# Patient Record
Sex: MALE | Race: WHITE | NOT HISPANIC OR LATINO
[De-identification: names, ages, dates, MRNs, and addresses within clinical notes are randomized per-mention and may not be internally consistent; named-entity substitution may affect disease eponyms.]

---

## 2017-02-13 ENCOUNTER — TRANSCRIPTION ENCOUNTER (OUTPATIENT)
Age: 27
End: 2017-02-13

## 2017-11-01 ENCOUNTER — RX RENEWAL (OUTPATIENT)
Age: 27
End: 2017-11-01

## 2017-11-03 ENCOUNTER — RX RENEWAL (OUTPATIENT)
Age: 27
End: 2017-11-03

## 2017-12-05 ENCOUNTER — RX RENEWAL (OUTPATIENT)
Age: 27
End: 2017-12-05

## 2019-04-30 ENCOUNTER — TRANSCRIPTION ENCOUNTER (OUTPATIENT)
Age: 29
End: 2019-04-30

## 2019-09-03 ENCOUNTER — TRANSCRIPTION ENCOUNTER (OUTPATIENT)
Age: 29
End: 2019-09-03

## 2019-09-17 ENCOUNTER — TRANSCRIPTION ENCOUNTER (OUTPATIENT)
Age: 29
End: 2019-09-17

## 2019-10-09 ENCOUNTER — TRANSCRIPTION ENCOUNTER (OUTPATIENT)
Age: 29
End: 2019-10-09

## 2021-09-21 ENCOUNTER — TRANSCRIPTION ENCOUNTER (OUTPATIENT)
Age: 31
End: 2021-09-21

## 2021-10-28 ENCOUNTER — EMERGENCY (EMERGENCY)
Facility: HOSPITAL | Age: 31
LOS: 0 days | Discharge: HOME | End: 2021-10-28
Attending: EMERGENCY MEDICINE
Payer: COMMERCIAL

## 2021-10-28 VITALS
HEART RATE: 88 BPM | RESPIRATION RATE: 20 BRPM | OXYGEN SATURATION: 98 % | DIASTOLIC BLOOD PRESSURE: 89 MMHG | SYSTOLIC BLOOD PRESSURE: 145 MMHG | TEMPERATURE: 98 F

## 2021-10-28 VITALS — HEIGHT: 70 IN

## 2021-10-28 DIAGNOSIS — H57.13 OCULAR PAIN, BILATERAL: ICD-10-CM

## 2021-10-28 DIAGNOSIS — F41.9 ANXIETY DISORDER, UNSPECIFIED: ICD-10-CM

## 2021-10-28 DIAGNOSIS — F32.9 MAJOR DEPRESSIVE DISORDER, SINGLE EPISODE, UNSPECIFIED: ICD-10-CM

## 2021-10-28 DIAGNOSIS — H16.293 OTHER KERATOCONJUNCTIVITIS, BILATERAL: ICD-10-CM

## 2021-10-28 DIAGNOSIS — H53.149 VISUAL DISCOMFORT, UNSPECIFIED: ICD-10-CM

## 2021-10-28 PROCEDURE — 99283 EMERGENCY DEPT VISIT LOW MDM: CPT

## 2021-10-28 RX ORDER — TRAMADOL HYDROCHLORIDE 50 MG/1
1 TABLET ORAL
Qty: 6 | Refills: 0
Start: 2021-10-28 | End: 2021-10-29

## 2021-10-28 RX ORDER — TRAMADOL HYDROCHLORIDE 50 MG/1
1 TABLET ORAL
Qty: 9 | Refills: 0
Start: 2021-10-28 | End: 2021-10-30

## 2021-10-28 RX ORDER — FLUORESCEIN SODIUM 9 MG
1 STRIP OPHTHALMIC (EYE) ONCE
Refills: 0 | Status: COMPLETED | OUTPATIENT
Start: 2021-10-28 | End: 2021-10-28

## 2021-10-28 RX ORDER — ERYTHROMYCIN BASE 5 MG/GRAM
1 OINTMENT (GRAM) OPHTHALMIC (EYE)
Qty: 1 | Refills: 0
Start: 2021-10-28 | End: 2021-11-03

## 2021-10-28 RX ADMIN — Medication 1 APPLICATION(S): at 23:05

## 2021-10-28 RX ADMIN — Medication 2 DROP(S): at 23:06

## 2021-10-28 NOTE — ED STATDOCS - CARE PROVIDER_API CALL
Charles Rolle  OPHTHALMOLOGY  [address] [place] [address], [address]  [place] [address]  Phone: [phone]  Fax: [phone]  Follow Up Time: Urgent

## 2021-10-28 NOTE — ED STATDOCS - CLINICAL SUMMARY MEDICAL DECISION MAKING FREE TEXT BOX
With fluorescein stain eyes, eval for other cause, UV keratitis vs. conjunctivitis, vs. foreign body vs. corneal abrasion.

## 2021-10-28 NOTE — ED ADULT TRIAGE NOTE - CHIEF COMPLAINT QUOTE
Pt. presents to ED c/o bilateral eye pain/burning. Pt. states he was driving home from work when his eyes began to burn. Pt. put visine in eyes and then eyes worsened. Pt. reports burning and states it is too painful to open his eyes

## 2021-10-28 NOTE — ED ADULT NURSE NOTE - OBJECTIVE STATEMENT
Pt. reports to ED for eye pain.  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  Pt. reports he rested his eyes and was unable to open them without feeling like "sandpaper" scratching his eyes. Pt. reports to ED for eye pain.  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  Pt. reports he rested his eyes and was unable to open them without feeling like "sandpaper" scratching his eyes.  Pt presents with eye redness.

## 2021-10-28 NOTE — ED STATDOCS - NSFOLLOWUPINSTRUCTIONS_ED_ALL_ED_FT
Dry Eye Syndrome    WHAT YOU NEED TO KNOW:    Dry eye syndrome happens when the eye has trouble keeping moisture. This may be caused by a lack of tears or having tears that cannot moisturize the eye. It may also happen when tears leave the eye too quickly. Dry eye syndrome may also be called dry eye disease or keratoconjunctivitis sicca (KCS).     DISCHARGE INSTRUCTIONS:    Contact your healthcare provider if:   •Your dry eyes do not get better with treatment or get worse.       •You have thick, yellow drainage from one or both eyes.       •Your eyelids or skin around your eyes is red and swollen.       •You have changes in your vision.       •You have questions or concerns about your condition or care.      Medicines:   •Medicine may be given to decrease pain or swelling, or treat an eye infection. You may also need medicine to help your eyes make more tears. These medicines will be given as eyedrops.       •Take your medicine as directed. Contact your healthcare provider if you think your medicine is not helping or if you have side effects. Tell him of her if you are allergic to any medicine. Keep a list of the medicines, vitamins, and herbs you take. Include the amounts, and when and why you take them. Bring the list or the pill bottles to follow-up visits. Carry your medicine list with you in case of an emergency.      Self-care:   •Use artificial tears, gels, and lubricating ointments as directed. They are available without a doctor's order. These products can replace tears and help add moisture to your eyes. Ask your healthcare provider how often to use these products. Also ask where to buy them.       •Apply a warm compress to your eyelids as directed. Use a soft washcloth soaked in warm water. Leave the compress on your eyelids for 5 minutes. Gently massage your eyelids after you remove the compress. These actions may help open your tear glands. Your tear glands can make oil that will help keep tears and moisture on the eye's surface.       •Wear glasses or sunglasses that cover the sides of your eyes and fit close to your face. These will protect your eyes from dry air. They may also help keep moisture in your eyes.       •Use a humidifier in your home. A humidifier may help keep moisture in the air and prevent dry eyes.       •Take vitamins and supplements as directed. Certain vitamins and supplements may help decrease eye dryness. Examples include fish oil and vitamin A. Ask your healthcare provider what supplements you need and how often to take them.       •Eat foods with high amounts of omega-3 fatty acids. Examples include salmon, tuna, walnuts, and flaxseeds. Omega-3 fatty acids may help relieve dry eyes. Ask your healthcare provider for a list of foods that contain fatty acids and how much you should eat each day.   Sources of Omega 3           •Do not smoke. Nicotine and other chemicals in cigarettes and cigars can cause lung damage. Smoke from cigarettes and cigars can make dry eyes worse. Ask your healthcare provider for information if you currently smoke and need help to quit. E-cigarettes or smokeless tobacco still contain nicotine. Talk to your healthcare provider before you use these products.       Follow up with your healthcare provider as directed: Write down your questions so you remember to ask them during your visits.

## 2021-10-28 NOTE — ED STATDOCS - PATIENT PORTAL LINK FT
You can access the FollowMyHealth Patient Portal offered by [facility] by registering at the following website: [email or web address]. By joining [facility]’s FollowMyHealth portal, you will also be able to view your health information using other applications (apps) compatible with our system.

## 2021-10-28 NOTE — ED STATDOCS - PROGRESS NOTE DETAILS
FLuorescein staining without any uptake to suggest abrasion.  Consider UV keratitis vs keratoconjunctivitis.  Significant improvement with tetracaine.  NOt a contact lens wearer.  Will start on erythromycin ointment and encouraged saline drops.  Is able to get close f/u with Dr. Rolle.  D/c home with strict return precautions and prompt outpatient f/u.

## 2021-10-28 NOTE — ED STATDOCS - OBJECTIVE STATEMENT
30 y/o male presents to the ED c/o b/l eye pain and photophobia. Pt reports he feels like "sand paper is in the eye." Pt denies contact lens use. Pt does not know of any eye exposure. 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

## 2022-03-19 ENCOUNTER — TRANSCRIPTION ENCOUNTER (OUTPATIENT)
Age: 32
End: 2022-03-19

## 2022-07-19 ENCOUNTER — NON-APPOINTMENT (OUTPATIENT)
Age: 32
End: 2022-07-19

## 2022-07-26 ENCOUNTER — APPOINTMENT (OUTPATIENT)
Dept: INTERNAL MEDICINE | Facility: CLINIC | Age: 32
End: 2022-07-26

## 2022-07-26 VITALS
HEIGHT: 70 IN | SYSTOLIC BLOOD PRESSURE: 130 MMHG | BODY MASS INDEX: 31.78 KG/M2 | WEIGHT: 222 LBS | DIASTOLIC BLOOD PRESSURE: 84 MMHG

## 2022-07-26 DIAGNOSIS — Z00.00 ENCOUNTER FOR GENERAL ADULT MEDICAL EXAMINATION W/OUT ABNORMAL FINDINGS: ICD-10-CM

## 2022-07-26 DIAGNOSIS — G43.909 MIGRAINE, UNSPECIFIED, NOT INTRACTABLE, W/OUT STATUS MIGRAINOSUS: ICD-10-CM

## 2022-07-26 PROCEDURE — 36415 COLL VENOUS BLD VENIPUNCTURE: CPT

## 2022-07-26 PROCEDURE — 99385 PREV VISIT NEW AGE 18-39: CPT | Mod: 25

## 2022-07-26 RX ORDER — SERTRALINE HYDROCHLORIDE 100 MG/1
100 TABLET, FILM COATED ORAL DAILY
Qty: 90 | Refills: 1 | Status: ACTIVE | COMMUNITY
Start: 2021-10-12

## 2022-07-26 RX ORDER — SUMATRIPTAN 50 MG/1
50 TABLET, FILM COATED ORAL
Qty: 9 | Refills: 2 | Status: ACTIVE | COMMUNITY
Start: 2022-07-26

## 2022-07-26 RX ORDER — LISDEXAMFETAMINE DIMESYLATE 50 MG/1
50 CAPSULE ORAL DAILY
Qty: 30 | Refills: 0 | Status: DISCONTINUED | COMMUNITY
Start: 2017-11-03 | End: 2022-07-26

## 2022-07-26 RX ORDER — LISDEXAMFETAMINE DIMESYLATE 40 MG/1
40 CAPSULE ORAL DAILY
Refills: 0 | Status: ACTIVE | COMMUNITY
Start: 2022-07-15

## 2022-07-26 RX ORDER — LISDEXAMFETAMINE DIMESYLATE 50 MG/1
50 CAPSULE ORAL
Qty: 30 | Refills: 0 | Status: DISCONTINUED | COMMUNITY
Start: 2017-12-05 | End: 2022-07-26

## 2022-07-26 RX ORDER — LORAZEPAM 1 MG/1
1 TABLET ORAL
Qty: 90 | Refills: 0 | Status: DISCONTINUED | COMMUNITY
Start: 2017-11-03 | End: 2022-07-26

## 2022-07-26 RX ORDER — LISDEXAMFETAMINE DIMESYLATE 50 MG/1
50 CAPSULE ORAL
Qty: 30 | Refills: 0 | Status: DISCONTINUED | COMMUNITY
Start: 2017-11-01 | End: 2022-07-26

## 2022-07-26 NOTE — REVIEW OF SYSTEMS
[Fever] : no fever [Chest Pain] : no chest pain [Shortness Of Breath] : no shortness of breath [Abdominal Pain] : no abdominal pain [Muscle Weakness] : no muscle weakness [Headache] : no headache [Dizziness] : no dizziness

## 2022-07-26 NOTE — ASSESSMENT
[FreeTextEntry1] : His exam is unremarkable.\par Fasting labs including thyroid function was sent.\par \par History of anxiety/depression–he feels that his depression symptoms are not nearly as prominent as his anxiety symptoms.  He has been on the same dose medication for years.  He follows the regimen very closely and is never overused or abused his medications.  He will be trying to find a new psychiatrist in the near future.  Options including increasing the dose of Zoloft and slowly decreasing/tapering the Ativan dose were discussed.\par \par History of ADHD–he still follows with neurologist who prescribes him Vyvanse 40 mg daily.  He uses it mostly during the week while at work and not on weekends.  If symptoms have been stable.\par \par History of migraines–uses Imitrex on an occasional basis.

## 2022-07-26 NOTE — HISTORY OF PRESENT ILLNESS
[FreeTextEntry1] : Patient presents for new pt physical.  [de-identified] : 32-year-old male presents for initial visit as well as annual wellness exam and fasting labs.\par He has a history of anxiety/depression and ADHD for at least the past 15 years.  He has been on Zoloft 100 mg and Ativan 1.5 mg twice daily for many years.  He is also on Vyvanse 40 mg daily.  Also has a history of occasional migraines for which she has used Imitrex in the past.  He has been seeing a neurologist who has been prescribing his medications for the past 2 years but was told he needs to follow-up with his psychiatrist in the near future.  He has been generally well without any recent illness.\par \par He works as an .  He is single with no children.

## 2022-07-27 LAB
ALBUMIN SERPL ELPH-MCNC: 5.1 G/DL
ALP BLD-CCNC: 60 U/L
ALT SERPL-CCNC: 24 U/L
ANION GAP SERPL CALC-SCNC: 12 MMOL/L
AST SERPL-CCNC: 27 U/L
BASOPHILS # BLD AUTO: 0.08 K/UL
BASOPHILS NFR BLD AUTO: 0.9 %
BILIRUB SERPL-MCNC: 0.5 MG/DL
BUN SERPL-MCNC: 10 MG/DL
CALCIUM SERPL-MCNC: 10.4 MG/DL
CHLORIDE SERPL-SCNC: 101 MMOL/L
CHOLEST SERPL-MCNC: 189 MG/DL
CO2 SERPL-SCNC: 26 MMOL/L
CREAT SERPL-MCNC: 1.22 MG/DL
EGFR: 82 ML/MIN/1.73M2
EOSINOPHIL # BLD AUTO: 0.13 K/UL
EOSINOPHIL NFR BLD AUTO: 1.4 %
GLUCOSE SERPL-MCNC: 83 MG/DL
HCT VFR BLD CALC: 44.8 %
HDLC SERPL-MCNC: 40 MG/DL
HGB BLD-MCNC: 15.1 G/DL
IMM GRANULOCYTES NFR BLD AUTO: 0.2 %
LDLC SERPL CALC-MCNC: 116 MG/DL
LYMPHOCYTES # BLD AUTO: 3.16 K/UL
LYMPHOCYTES NFR BLD AUTO: 33.9 %
MAN DIFF?: NORMAL
MCHC RBC-ENTMCNC: 28.6 PG
MCHC RBC-ENTMCNC: 33.7 GM/DL
MCV RBC AUTO: 84.8 FL
MONOCYTES # BLD AUTO: 0.63 K/UL
MONOCYTES NFR BLD AUTO: 6.8 %
NEUTROPHILS # BLD AUTO: 5.3 K/UL
NEUTROPHILS NFR BLD AUTO: 56.8 %
NONHDLC SERPL-MCNC: 150 MG/DL
PLATELET # BLD AUTO: 339 K/UL
POTASSIUM SERPL-SCNC: 4.4 MMOL/L
PROT SERPL-MCNC: 7.4 G/DL
RBC # BLD: 5.28 M/UL
RBC # FLD: 12.2 %
SODIUM SERPL-SCNC: 139 MMOL/L
T4 SERPL-MCNC: 6 UG/DL
TRIGL SERPL-MCNC: 168 MG/DL
TSH SERPL-ACNC: 1.9 UIU/ML
WBC # FLD AUTO: 9.32 K/UL

## 2022-09-13 ENCOUNTER — APPOINTMENT (OUTPATIENT)
Dept: INTERNAL MEDICINE | Facility: CLINIC | Age: 32
End: 2022-09-13

## 2022-09-13 ENCOUNTER — NON-APPOINTMENT (OUTPATIENT)
Age: 32
End: 2022-09-13

## 2022-09-13 VITALS — DIASTOLIC BLOOD PRESSURE: 80 MMHG | SYSTOLIC BLOOD PRESSURE: 138 MMHG

## 2022-09-13 DIAGNOSIS — F32.A DEPRESSION, UNSPECIFIED: ICD-10-CM

## 2022-09-13 DIAGNOSIS — F41.9 ANXIETY DISORDER, UNSPECIFIED: ICD-10-CM

## 2022-09-13 DIAGNOSIS — F90.9 ATTENTION-DEFICIT HYPERACTIVITY DISORDER, UNSPECIFIED TYPE: ICD-10-CM

## 2022-09-13 PROCEDURE — 99213 OFFICE O/P EST LOW 20 MIN: CPT

## 2022-09-13 RX ORDER — LORAZEPAM 1 MG/1
1 TABLET ORAL TWICE DAILY
Qty: 90 | Refills: 0 | Status: ACTIVE | COMMUNITY
Start: 2017-12-05 | End: 1900-01-01

## 2022-09-13 NOTE — PHYSICAL EXAM
[No Acute Distress] : no acute distress [No JVD] : no jugular venous distention [Clear to Auscultation] : lungs were clear to auscultation bilaterally [Normal] : normal rate, regular rhythm, normal S1 and S2 and no murmur heard [No Edema] : there was no peripheral edema [Grossly Normal Strength/Tone] : grossly normal strength/tone [No Focal Deficits] : no focal deficits [Normal Affect] : the affect was normal [Alert and Oriented x3] : oriented to person, place, and time

## 2022-09-13 NOTE — HISTORY OF PRESENT ILLNESS
[de-identified] : 33 y/o male presents for follow up and prescription renewals.\par He has a longstanding history of anxiety/depression as well as ADHD.  At his initial visit on 726 he stated that his previous doctor will no longer going to refill his prescriptions.  He has been on Zoloft, Ativan and Vyvanse for years.  He was trying to make an appoint with a psychiatrist and actually does have an appointment for initial evaluation on 9/27.  He is running out of Ativan and instead taking 3/day he is only been taking 1 today and his anxiety is significantly worse.  He does not have enough to last to his initial appointment with a psychiatrist.

## 2022-09-13 NOTE — REVIEW OF SYSTEMS
[Chest Pain] : no chest pain [Shortness Of Breath] : no shortness of breath [Abdominal Pain] : no abdominal pain [Heartburn] : heartburn [Anxiety] : anxiety

## 2022-09-13 NOTE — ASSESSMENT
[FreeTextEntry1] : History of anxiety/depression–he does have initial appointment with a psychiatrist on 9/27.  He continues to deal with significant anxiety that is affecting his life and his work.\par Ativan 1 mg #90 was renewed.  He is going to continue this for now until he sees psychiatrist for the initial appointment in the next few weeks.  He will also continue with Zoloft 200 mg daily.

## 2023-01-04 ENCOUNTER — APPOINTMENT (OUTPATIENT)
Dept: NEUROLOGY | Facility: CLINIC | Age: 33
End: 2023-01-04

## 2023-02-19 ENCOUNTER — NON-APPOINTMENT (OUTPATIENT)
Age: 33
End: 2023-02-19

## 2023-10-17 NOTE — HEALTH RISK ASSESSMENT
Is there a clearance somewhere to send? [Former] : Former [5-9] : 5-9 [No] : In the past 12 months have you used drugs other than those required for medical reasons? No [Medical reason not done] : Medical reason not done [YearQuit] : 2019 [de-identified] : History of anxiety and depression.